# Patient Record
Sex: FEMALE | Race: WHITE | NOT HISPANIC OR LATINO | Employment: UNEMPLOYED | URBAN - METROPOLITAN AREA
[De-identification: names, ages, dates, MRNs, and addresses within clinical notes are randomized per-mention and may not be internally consistent; named-entity substitution may affect disease eponyms.]

---

## 2023-02-09 PROBLEM — I10 HTN (HYPERTENSION): Status: ACTIVE | Noted: 2023-02-09

## 2023-02-09 PROBLEM — R20.0 NUMBNESS: Status: ACTIVE | Noted: 2023-02-09

## 2023-02-09 PROBLEM — R03.0 ELEVATED BLOOD PRESSURE READING: Status: ACTIVE | Noted: 2023-02-09

## 2023-02-09 PROBLEM — R53.83 FATIGUE: Status: ACTIVE | Noted: 2023-02-09

## 2023-02-09 PROBLEM — F17.200 NICOTINE DEPENDENCE: Status: ACTIVE | Noted: 2023-02-09

## 2023-02-09 PROBLEM — E66.3 OVERWEIGHT WITH BODY MASS INDEX (BMI) OF 26 TO 26.9 IN ADULT: Status: ACTIVE | Noted: 2023-02-09

## 2023-02-09 RX ORDER — IBUPROFEN 200 MG
1 TABLET ORAL DAILY
COMMUNITY
End: 2023-03-22 | Stop reason: ALTCHOICE

## 2023-02-09 RX ORDER — IBUPROFEN 200 MG
TABLET ORAL DAILY
COMMUNITY
Start: 2022-08-31 | End: 2023-03-22 | Stop reason: ALTCHOICE

## 2023-02-09 RX ORDER — AMLODIPINE BESYLATE 5 MG/1
1 TABLET ORAL DAILY
COMMUNITY
Start: 2022-06-29 | End: 2023-03-22 | Stop reason: ALTCHOICE

## 2023-02-09 RX ORDER — AMLODIPINE BESYLATE 10 MG/1
10 TABLET ORAL DAILY
COMMUNITY
End: 2024-01-17 | Stop reason: SDUPTHER

## 2023-03-20 LAB
ALANINE AMINOTRANSFERASE (SGPT) (U/L) IN SER/PLAS: 19 U/L (ref 7–45)
ALBUMIN (G/DL) IN SER/PLAS: 4.5 G/DL (ref 3.4–5)
ALKALINE PHOSPHATASE (U/L) IN SER/PLAS: 47 U/L (ref 33–136)
ANION GAP IN SER/PLAS: 11 MMOL/L (ref 10–20)
ASPARTATE AMINOTRANSFERASE (SGOT) (U/L) IN SER/PLAS: 21 U/L (ref 9–39)
BILIRUBIN TOTAL (MG/DL) IN SER/PLAS: 0.7 MG/DL (ref 0–1.2)
CALCIUM (MG/DL) IN SER/PLAS: 9.6 MG/DL (ref 8.6–10.3)
CARBON DIOXIDE, TOTAL (MMOL/L) IN SER/PLAS: 27 MMOL/L (ref 21–32)
CHLORIDE (MMOL/L) IN SER/PLAS: 105 MMOL/L (ref 98–107)
CHOLESTEROL (MG/DL) IN SER/PLAS: 228 MG/DL (ref 0–199)
CHOLESTEROL IN HDL (MG/DL) IN SER/PLAS: 90.8 MG/DL
CHOLESTEROL/HDL RATIO: 2.5
CREATININE (MG/DL) IN SER/PLAS: 0.71 MG/DL (ref 0.5–1.05)
ERYTHROCYTE DISTRIBUTION WIDTH (RATIO) BY AUTOMATED COUNT: 13.2 % (ref 11.5–14.5)
ERYTHROCYTE MEAN CORPUSCULAR HEMOGLOBIN CONCENTRATION (G/DL) BY AUTOMATED: 31.7 G/DL (ref 32–36)
ERYTHROCYTE MEAN CORPUSCULAR VOLUME (FL) BY AUTOMATED COUNT: 94 FL (ref 80–100)
ERYTHROCYTES (10*6/UL) IN BLOOD BY AUTOMATED COUNT: 4.51 X10E12/L (ref 4–5.2)
GFR FEMALE: >90 ML/MIN/1.73M2
GLUCOSE (MG/DL) IN SER/PLAS: 101 MG/DL (ref 74–99)
HEMATOCRIT (%) IN BLOOD BY AUTOMATED COUNT: 42.6 % (ref 36–46)
HEMOGLOBIN (G/DL) IN BLOOD: 13.5 G/DL (ref 12–16)
LDL: 123 MG/DL (ref 0–99)
LEUKOCYTES (10*3/UL) IN BLOOD BY AUTOMATED COUNT: 4.9 X10E9/L (ref 4.4–11.3)
PLATELETS (10*3/UL) IN BLOOD AUTOMATED COUNT: 404 X10E9/L (ref 150–450)
POTASSIUM (MMOL/L) IN SER/PLAS: 4.3 MMOL/L (ref 3.5–5.3)
PROTEIN TOTAL: 7.2 G/DL (ref 6.4–8.2)
SODIUM (MMOL/L) IN SER/PLAS: 139 MMOL/L (ref 136–145)
THYROTROPIN (MIU/L) IN SER/PLAS BY DETECTION LIMIT <= 0.05 MIU/L: 1.87 MIU/L (ref 0.44–3.98)
TRIGLYCERIDE (MG/DL) IN SER/PLAS: 70 MG/DL (ref 0–149)
UREA NITROGEN (MG/DL) IN SER/PLAS: 17 MG/DL (ref 6–23)
VLDL: 14 MG/DL (ref 0–40)

## 2023-03-22 ENCOUNTER — OFFICE VISIT (OUTPATIENT)
Dept: PRIMARY CARE | Facility: CLINIC | Age: 68
End: 2023-03-22
Payer: MEDICARE

## 2023-03-22 VITALS
HEART RATE: 76 BPM | BODY MASS INDEX: 27.39 KG/M2 | SYSTOLIC BLOOD PRESSURE: 138 MMHG | TEMPERATURE: 97.9 F | HEIGHT: 65 IN | OXYGEN SATURATION: 98 % | DIASTOLIC BLOOD PRESSURE: 78 MMHG | WEIGHT: 164.4 LBS

## 2023-03-22 DIAGNOSIS — I10 HYPERTENSION, UNSPECIFIED TYPE: Chronic | ICD-10-CM

## 2023-03-22 DIAGNOSIS — Z00.00 ANNUAL PHYSICAL EXAM: ICD-10-CM

## 2023-03-22 DIAGNOSIS — F17.211 CIGARETTE NICOTINE DEPENDENCE IN REMISSION: ICD-10-CM

## 2023-03-22 DIAGNOSIS — Z00.00 MEDICARE ANNUAL WELLNESS VISIT, SUBSEQUENT: Primary | ICD-10-CM

## 2023-03-22 PROCEDURE — 93000 ELECTROCARDIOGRAM COMPLETE: CPT | Performed by: INTERNAL MEDICINE

## 2023-03-22 PROCEDURE — G0439 PPPS, SUBSEQ VISIT: HCPCS | Performed by: INTERNAL MEDICINE

## 2023-03-22 PROCEDURE — 1159F MED LIST DOCD IN RCRD: CPT | Performed by: INTERNAL MEDICINE

## 2023-03-22 PROCEDURE — 3075F SYST BP GE 130 - 139MM HG: CPT | Performed by: INTERNAL MEDICINE

## 2023-03-22 PROCEDURE — 99397 PER PM REEVAL EST PAT 65+ YR: CPT | Performed by: INTERNAL MEDICINE

## 2023-03-22 PROCEDURE — 1160F RVW MEDS BY RX/DR IN RCRD: CPT | Performed by: INTERNAL MEDICINE

## 2023-03-22 PROCEDURE — 99213 OFFICE O/P EST LOW 20 MIN: CPT | Performed by: INTERNAL MEDICINE

## 2023-03-22 PROCEDURE — 3078F DIAST BP <80 MM HG: CPT | Performed by: INTERNAL MEDICINE

## 2023-03-22 PROCEDURE — G0442 ANNUAL ALCOHOL SCREEN 15 MIN: HCPCS | Performed by: INTERNAL MEDICINE

## 2023-03-22 PROCEDURE — 1170F FXNL STATUS ASSESSED: CPT | Performed by: INTERNAL MEDICINE

## 2023-03-22 PROCEDURE — G0444 DEPRESSION SCREEN ANNUAL: HCPCS | Performed by: INTERNAL MEDICINE

## 2023-03-22 ASSESSMENT — ACTIVITIES OF DAILY LIVING (ADL)
TAKING_MEDICATION: INDEPENDENT
DRESSING: INDEPENDENT
GROCERY_SHOPPING: INDEPENDENT
BATHING: INDEPENDENT
MANAGING_FINANCES: INDEPENDENT
DOING_HOUSEWORK: INDEPENDENT

## 2023-03-22 ASSESSMENT — PATIENT HEALTH QUESTIONNAIRE - PHQ9
SUM OF ALL RESPONSES TO PHQ9 QUESTIONS 1 AND 2: 0
1. LITTLE INTEREST OR PLEASURE IN DOING THINGS: NOT AT ALL
2. FEELING DOWN, DEPRESSED OR HOPELESS: NOT AT ALL

## 2023-03-22 ASSESSMENT — ENCOUNTER SYMPTOMS
EYES NEGATIVE: 1
HEMATOLOGIC/LYMPHATIC NEGATIVE: 1
RESPIRATORY NEGATIVE: 1
PSYCHIATRIC NEGATIVE: 1
CONSTITUTIONAL NEGATIVE: 1
NEUROLOGICAL NEGATIVE: 1
GASTROINTESTINAL NEGATIVE: 1
CARDIOVASCULAR NEGATIVE: 1

## 2023-03-22 NOTE — PROGRESS NOTES
"Subjective   Patient ID: Isha Allen is a 67 y.o. female who presents for Medicare Annual Wellness Visit Subsequent, Annual Exam, and Follow-up.    here  for MCR ,CPE and to f/u on HTN,tolerating Amlodipine well. She was done with using nicotine patches 3 weeks ago and now she does smoke 1/2 cigarette very rarely (just a habit).  she refused to have any immunization such the flu,covid,shingles.  in past,she refused mammogram,because the last one around 15 years ago was abnormal and went for Bx and was benign,but I advised her again to go for a 3 D mammogram,\"she will think about it\".  no cough,SOB,CP.  Last cologuard 4/2/22.               Review of Systems   Constitutional: Negative.    HENT: Negative.     Eyes: Negative.    Respiratory: Negative.     Cardiovascular: Negative.    Gastrointestinal: Negative.    Genitourinary: Negative.    Neurological: Negative.    Hematological: Negative.    Psychiatric/Behavioral: Negative.         Objective   /73 (BP Location: Left arm, Patient Position: Sitting, BP Cuff Size: Large adult)   Pulse 76   Temp 36.6 °C (97.9 °F) (Temporal)   Ht 1.645 m (5' 4.75\")   Wt 74.6 kg (164 lb 6.4 oz)   SpO2 98%   BMI 27.57 kg/m²     Physical Exam  Constitutional:       Appearance: Normal appearance.   HENT:      Head: Normocephalic and atraumatic.   Eyes:      Extraocular Movements: Extraocular movements intact.      Pupils: Pupils are equal, round, and reactive to light.   Cardiovascular:      Rate and Rhythm: Normal rate and regular rhythm.      Heart sounds: Normal heart sounds.   Pulmonary:      Effort: Pulmonary effort is normal.      Breath sounds: Normal breath sounds. No wheezing or rhonchi.   Chest:   Breasts:     Right: Normal.      Left: Normal.   Abdominal:      General: Abdomen is flat. Bowel sounds are normal. There is no distension.      Palpations: Abdomen is soft.   Musculoskeletal:         General: Normal range of motion.      Cervical back: Normal range of " motion and neck supple.      Left lower leg: No edema.   Skin:     General: Skin is warm.   Neurological:      General: No focal deficit present.      Mental Status: She is alert and oriented to person, place, and time.   Psychiatric:         Mood and Affect: Mood normal.         Behavior: Behavior normal.         Assessment/Plan

## 2023-03-22 NOTE — PROGRESS NOTES
Patient is here for an annual [facility] wellness visit, CPE, and follow up.

## 2023-03-23 NOTE — ASSESSMENT & PLAN NOTE
Done well with Nicotine patches for 4-5 months,now smokes 1/2 cigarettes very infrequently,counseling done to stop completely,I recommended for her to have CT chest low dose to screen for lung ca,but she refused.

## 2023-10-11 ENCOUNTER — TELEPHONE (OUTPATIENT)
Dept: PRIMARY CARE | Facility: CLINIC | Age: 68
End: 2023-10-11
Payer: MEDICARE

## 2023-10-11 NOTE — TELEPHONE ENCOUNTER
Pt has a itchy rash on her back and torso and up her neck. Pt didn't know if Dr. Longo wanted to see her or send her to Dermatology. Please advise

## 2023-10-13 ENCOUNTER — OFFICE VISIT (OUTPATIENT)
Dept: PRIMARY CARE | Facility: CLINIC | Age: 68
End: 2023-10-13
Payer: MEDICARE

## 2023-10-13 VITALS
HEART RATE: 64 BPM | BODY MASS INDEX: 26.36 KG/M2 | HEIGHT: 65 IN | SYSTOLIC BLOOD PRESSURE: 146 MMHG | OXYGEN SATURATION: 97 % | TEMPERATURE: 97.3 F | WEIGHT: 158.2 LBS | DIASTOLIC BLOOD PRESSURE: 83 MMHG

## 2023-10-13 DIAGNOSIS — F17.211 CIGARETTE NICOTINE DEPENDENCE IN REMISSION: ICD-10-CM

## 2023-10-13 DIAGNOSIS — L28.2 PRURITIC RASH: Primary | ICD-10-CM

## 2023-10-13 DIAGNOSIS — E78.5 HYPERLIPIDEMIA, UNSPECIFIED HYPERLIPIDEMIA TYPE: ICD-10-CM

## 2023-10-13 DIAGNOSIS — I10 PRIMARY HYPERTENSION: ICD-10-CM

## 2023-10-13 PROCEDURE — 1159F MED LIST DOCD IN RCRD: CPT | Performed by: INTERNAL MEDICINE

## 2023-10-13 PROCEDURE — 1160F RVW MEDS BY RX/DR IN RCRD: CPT | Performed by: INTERNAL MEDICINE

## 2023-10-13 PROCEDURE — 3077F SYST BP >= 140 MM HG: CPT | Performed by: INTERNAL MEDICINE

## 2023-10-13 PROCEDURE — 3079F DIAST BP 80-89 MM HG: CPT | Performed by: INTERNAL MEDICINE

## 2023-10-13 PROCEDURE — 99214 OFFICE O/P EST MOD 30 MIN: CPT | Performed by: INTERNAL MEDICINE

## 2023-10-13 RX ORDER — HYDROXYZINE HYDROCHLORIDE 25 MG/1
25 TABLET, FILM COATED ORAL 3 TIMES DAILY
Qty: 90 TABLET | Refills: 0 | Status: SHIPPED | OUTPATIENT
Start: 2023-10-13 | End: 2023-10-16 | Stop reason: SDUPTHER

## 2023-10-13 RX ORDER — HYDROXYZINE PAMOATE 50 MG/1
50 CAPSULE ORAL 3 TIMES DAILY PRN
Qty: 30 CAPSULE | Refills: 0 | Status: SHIPPED | OUTPATIENT
Start: 2023-10-13 | End: 2024-04-19

## 2023-10-13 ASSESSMENT — ENCOUNTER SYMPTOMS
CARDIOVASCULAR NEGATIVE: 1
CONSTITUTIONAL NEGATIVE: 1
HEMATOLOGIC/LYMPHATIC NEGATIVE: 1
PSYCHIATRIC NEGATIVE: 1
ROS SKIN COMMENTS: ITCHING.
NEUROLOGICAL NEGATIVE: 1
RESPIRATORY NEGATIVE: 1
GASTROINTESTINAL NEGATIVE: 1
EYES NEGATIVE: 1

## 2023-10-13 ASSESSMENT — PATIENT HEALTH QUESTIONNAIRE - PHQ9
1. LITTLE INTEREST OR PLEASURE IN DOING THINGS: NOT AT ALL
2. FEELING DOWN, DEPRESSED OR HOPELESS: NOT AT ALL
SUM OF ALL RESPONSES TO PHQ9 QUESTIONS 1 AND 2: 0

## 2023-10-13 NOTE — ASSESSMENT & PLAN NOTE
We need to rule out infestation in view of the new visitor even though they do not have any similar complaint.  Will refer to a dermatologist for definitive diagnosis and management.  I advised patient to take a cooler shower.  We will treat with Medrol Dosepak and Atarax as needed.  Will order complete labs.

## 2023-10-16 ENCOUNTER — TELEPHONE (OUTPATIENT)
Dept: PRIMARY CARE | Facility: CLINIC | Age: 68
End: 2023-10-16
Payer: MEDICARE

## 2023-10-16 RX ORDER — METHYLPREDNISOLONE 4 MG/1
TABLET ORAL
Qty: 21 TABLET | Refills: 0 | Status: SHIPPED | OUTPATIENT
Start: 2023-10-16 | End: 2023-10-23

## 2023-10-16 NOTE — TELEPHONE ENCOUNTER
Pt stopped in stated medication that was called in on Friday needs clarification.   Hydroxyzine 25mg and 50mg was called in. But steroid was not.  Does she stated she took the 50mg and left 25mg at pharmacy.    Please call pt at [phone]

## 2024-01-17 ENCOUNTER — APPOINTMENT (OUTPATIENT)
Dept: DERMATOLOGY | Facility: CLINIC | Age: 69
End: 2024-01-17
Payer: MEDICARE

## 2024-01-17 DIAGNOSIS — I10 PRIMARY HYPERTENSION: Primary | ICD-10-CM

## 2024-01-17 RX ORDER — AMLODIPINE BESYLATE 10 MG/1
10 TABLET ORAL DAILY
Qty: 90 TABLET | Refills: 0 | Status: SHIPPED | OUTPATIENT
Start: 2024-01-17 | End: 2024-03-20 | Stop reason: ALTCHOICE

## 2024-03-20 ENCOUNTER — OFFICE VISIT (OUTPATIENT)
Dept: PRIMARY CARE | Facility: CLINIC | Age: 69
End: 2024-03-20
Payer: MEDICARE

## 2024-03-20 VITALS
SYSTOLIC BLOOD PRESSURE: 149 MMHG | OXYGEN SATURATION: 98 % | BODY MASS INDEX: 25.99 KG/M2 | HEART RATE: 73 BPM | HEIGHT: 65 IN | TEMPERATURE: 97.8 F | WEIGHT: 156 LBS | DIASTOLIC BLOOD PRESSURE: 77 MMHG

## 2024-03-20 DIAGNOSIS — L28.2 PRURITIC RASH: ICD-10-CM

## 2024-03-20 DIAGNOSIS — Z00.00 ROUTINE GENERAL MEDICAL EXAMINATION AT HEALTH CARE FACILITY: ICD-10-CM

## 2024-03-20 DIAGNOSIS — Z00.00 MEDICARE ANNUAL WELLNESS VISIT, SUBSEQUENT: Primary | ICD-10-CM

## 2024-03-20 DIAGNOSIS — L50.9 URTICARIA, UNSPECIFIED: ICD-10-CM

## 2024-03-20 DIAGNOSIS — I10 PRIMARY HYPERTENSION: ICD-10-CM

## 2024-03-20 DIAGNOSIS — F17.211 CIGARETTE NICOTINE DEPENDENCE IN REMISSION: ICD-10-CM

## 2024-03-20 DIAGNOSIS — Z00.00 ANNUAL PHYSICAL EXAM: ICD-10-CM

## 2024-03-20 PROCEDURE — 99214 OFFICE O/P EST MOD 30 MIN: CPT | Performed by: INTERNAL MEDICINE

## 2024-03-20 PROCEDURE — G0442 ANNUAL ALCOHOL SCREEN 15 MIN: HCPCS | Performed by: INTERNAL MEDICINE

## 2024-03-20 PROCEDURE — G0439 PPPS, SUBSEQ VISIT: HCPCS | Performed by: INTERNAL MEDICINE

## 2024-03-20 PROCEDURE — G0444 DEPRESSION SCREEN ANNUAL: HCPCS | Performed by: INTERNAL MEDICINE

## 2024-03-20 PROCEDURE — 99397 PER PM REEVAL EST PAT 65+ YR: CPT | Performed by: INTERNAL MEDICINE

## 2024-03-20 PROCEDURE — 93000 ELECTROCARDIOGRAM COMPLETE: CPT | Performed by: INTERNAL MEDICINE

## 2024-03-20 PROCEDURE — 1123F ACP DISCUSS/DSCN MKR DOCD: CPT | Performed by: INTERNAL MEDICINE

## 2024-03-20 PROCEDURE — 1160F RVW MEDS BY RX/DR IN RCRD: CPT | Performed by: INTERNAL MEDICINE

## 2024-03-20 PROCEDURE — 1157F ADVNC CARE PLAN IN RCRD: CPT | Performed by: INTERNAL MEDICINE

## 2024-03-20 PROCEDURE — 99406 BEHAV CHNG SMOKING 3-10 MIN: CPT | Performed by: INTERNAL MEDICINE

## 2024-03-20 PROCEDURE — 3078F DIAST BP <80 MM HG: CPT | Performed by: INTERNAL MEDICINE

## 2024-03-20 PROCEDURE — G0446 INTENS BEHAVE THER CARDIO DX: HCPCS | Performed by: INTERNAL MEDICINE

## 2024-03-20 PROCEDURE — 3077F SYST BP >= 140 MM HG: CPT | Performed by: INTERNAL MEDICINE

## 2024-03-20 PROCEDURE — 1170F FXNL STATUS ASSESSED: CPT | Performed by: INTERNAL MEDICINE

## 2024-03-20 PROCEDURE — 1158F ADVNC CARE PLAN TLK DOCD: CPT | Performed by: INTERNAL MEDICINE

## 2024-03-20 PROCEDURE — 1159F MED LIST DOCD IN RCRD: CPT | Performed by: INTERNAL MEDICINE

## 2024-03-20 RX ORDER — FLUOCINONIDE 0.5 MG/G
CREAM TOPICAL
COMMUNITY
Start: 2023-12-05

## 2024-03-20 RX ORDER — TRIAMCINOLONE ACETONIDE 1 MG/G
CREAM TOPICAL
COMMUNITY
Start: 2023-11-02

## 2024-03-20 RX ORDER — LOSARTAN POTASSIUM 50 MG/1
50 TABLET ORAL DAILY
Qty: 30 TABLET | Refills: 11 | Status: SHIPPED | OUTPATIENT
Start: 2024-03-20 | End: 2024-04-19 | Stop reason: DRUGHIGH

## 2024-03-20 ASSESSMENT — ACTIVITIES OF DAILY LIVING (ADL)
DOING_HOUSEWORK: INDEPENDENT
GROCERY_SHOPPING: INDEPENDENT
TAKING_MEDICATION: INDEPENDENT
BATHING: INDEPENDENT
MANAGING_FINANCES: INDEPENDENT
DRESSING: INDEPENDENT

## 2024-03-20 ASSESSMENT — ENCOUNTER SYMPTOMS
HEMATOLOGIC/LYMPHATIC NEGATIVE: 1
RESPIRATORY NEGATIVE: 1
EYES NEGATIVE: 1
CONSTITUTIONAL NEGATIVE: 1
GASTROINTESTINAL NEGATIVE: 1
CARDIOVASCULAR NEGATIVE: 1
PSYCHIATRIC NEGATIVE: 1
ROS SKIN COMMENTS: ITCHING.
NEUROLOGICAL NEGATIVE: 1

## 2024-03-20 NOTE — ASSESSMENT & PLAN NOTE
Switch from amlodipine to losartan and monitor her itching.  Her itching could be due to amlodipine?  Monitor home BP and follow-up with me in 1 month.  Continue low-sodium diet    Your ASCVD risk is 22.6%, will order CT cardiac scoring, keep BP BMI LDL at goal to prevent stroke and heart attack.  Further treatment will be decided after reviewing her CT cardiac scoring result.

## 2024-03-20 NOTE — ASSESSMENT & PLAN NOTE
I spent more than 3 minutes counseling patient about need for smoking/tobacco  cessation and how I can support efforts when patient is ready to quit,discussed Nicotine replacement therapy,bupropion,hypnosis,support groups,and acupuncture as potential options.  patient currently has no signs or symptoms of tobacco related disease.

## 2024-03-20 NOTE — ASSESSMENT & PLAN NOTE
Refer to  allergist.  May continue hydroxyzine as needed.  Will change amlodipine to losartan to see if amlodipine was the cause for the itching and follow-up in 1 month.

## 2024-03-20 NOTE — ASSESSMENT & PLAN NOTE
Patient to bring [facility].  She declined mammogram DEXA scan.  Will order blood test.  Your risk for ASCVD is 22.6%, we need to keep her LDL and BMI at goal, will check CT cardiac scoring and lipid profile and consider statin to decrease your risk for heart attack or stroke.

## 2024-03-20 NOTE — PROGRESS NOTES
"Subjective   Patient ID: Isha Allen is a 68 y.o. female who presents for Medicare Annual Wellness Visit Subsequent, Annual Exam, and Follow-up.    here  for MCR ,CPE and to f/u on HTN,on Amlodipine .  She continue to have a itching ,she saw  dermatologist, had a biopsy and was tried on many medication without any improvement and told her it could be due to amlodipine  She cut back on smoking and now 1 pack will last for 2 weeks, she denies any cough or shortness of breath, she declined to go for CT chest to screen for lung cancer  she refused to have any immunization such the flu,covid,shingles.  in past,she refused mammogram,because the last one around 15 years ago was abnormal and went for Bx and was benign,but I advised her again to go for a 3 D mammogram,\"she will think about it\".  no cough,SOB,CP.  Last cologuard 4/2/22.negative.            Review of Systems   Constitutional: Negative.    HENT: Negative.     Eyes: Negative.    Respiratory: Negative.     Cardiovascular: Negative.    Gastrointestinal: Negative.    Genitourinary: Negative.    Skin:         Itching.   Neurological: Negative.    Hematological: Negative.    Psychiatric/Behavioral: Negative.         Objective   /77 (BP Location: Right arm, Patient Position: Sitting, BP Cuff Size: Adult)   Pulse 73   Temp 36.6 °C (97.8 °F) (Temporal)   Ht 1.645 m (5' 4.75\")   Wt 70.8 kg (156 lb)   SpO2 98%   BMI 26.16 kg/m²     Physical Exam  Constitutional:       Appearance: Normal appearance.   HENT:      Head: Normocephalic and atraumatic.      Right Ear: Tympanic membrane, ear canal and external ear normal.      Left Ear: Tympanic membrane, ear canal and external ear normal.      Nose: Nose normal.      Mouth/Throat:      Mouth: Mucous membranes are moist.      Pharynx: No oropharyngeal exudate or posterior oropharyngeal erythema.   Eyes:      General: No scleral icterus.     Extraocular Movements: Extraocular movements intact.      Pupils: Pupils " are equal, round, and reactive to light.   Neck:      Vascular: No carotid bruit.   Cardiovascular:      Rate and Rhythm: Normal rate and regular rhythm.      Heart sounds: Normal heart sounds.   Pulmonary:      Effort: Pulmonary effort is normal.      Breath sounds: Normal breath sounds. No wheezing or rhonchi.   Chest:   Breasts:     Right: Normal.      Left: Normal.   Abdominal:      General: Abdomen is flat. Bowel sounds are normal. There is no distension.      Palpations: Abdomen is soft.   Musculoskeletal:         General: Normal range of motion.      Cervical back: Normal range of motion and neck supple.      Right lower leg: No edema.      Left lower leg: No edema.   Lymphadenopathy:      Cervical: No cervical adenopathy.   Skin:     General: Skin is warm.      Comments: Itchy marks on arms and upper back.   Neurological:      General: No focal deficit present.      Mental Status: She is alert and oriented to person, place, and time.   Psychiatric:         Mood and Affect: Mood normal.         Behavior: Behavior normal.         Assessment/Plan   Problem List Items Addressed This Visit             ICD-10-CM    HTN (hypertension) I10     Switch from amlodipine to losartan and monitor her itching.  Her itching could be due to amlodipine?  Monitor home BP and follow-up with me in 1 month.  Continue low-sodium diet    Your ASCVD risk is 22.6%, will order CT cardiac scoring, keep BP BMI LDL at goal to prevent stroke and heart attack.  Further treatment will be decided after reviewing her CT cardiac scoring result.           Relevant Medications    losartan (Cozaar) 50 mg tablet    Other Relevant Orders    ECG 12 lead (Clinic Performed) (Completed)    CT cardiac scoring wo IV contrast    Nicotine dependence F17.200     I spent more than 3 minutes counseling patient about need for smoking/tobacco  cessation and how I can support efforts when patient is ready to quit,discussed Nicotine replacement  therapy,bupropion,hypnosis,support groups,and acupuncture as potential options.  patient currently has no signs or symptoms of tobacco related disease.         Urticaria, unspecified L50.9     Refer to allergist.         Relevant Orders    Referral to Allergy    Pruritic rash L28.2     Refer to  allergist.  May continue hydroxyzine as needed.  Will change amlodipine to losartan to see if amlodipine was the cause for the itching and follow-up in 1 month.         Medicare annual wellness visit, subsequent - Primary Z00.00     Patient to bring [email or web address].  She declined mammogram DEXA scan.  Will order blood test.  Your risk for ASCVD is 22.6%, we need to keep her LDL and BMI at goal, will check CT cardiac scoring and lipid profile and consider statin to decrease your risk for heart attack or stroke.           Annual physical exam Z00.00     Complete physical examination completed today.  Advised to keep a heart healthy, low-fat diet as recommended diet is the Mediterranean diet.  Advised to exercise regularly for 30 minutes daily 5 days a week and maintain 150 minutes of exercise per week.  Discussed age-appropriate cancer screening,Immunization and recommendation were given.  Advised on regular eye and dental visit.  Advised on staying well-hydrated.          Other Visit Diagnoses         Codes    Routine general medical examination at health care facility     Z00.00

## 2024-03-21 PROBLEM — Z00.00 ANNUAL PHYSICAL EXAM: Status: ACTIVE | Noted: 2024-03-21

## 2024-04-12 ENCOUNTER — TELEPHONE (OUTPATIENT)
Dept: PRIMARY CARE | Facility: CLINIC | Age: 69
End: 2024-04-12
Payer: MEDICARE

## 2024-04-12 DIAGNOSIS — K04.7 TOOTH INFECTION: Primary | ICD-10-CM

## 2024-04-12 RX ORDER — AMOXICILLIN 875 MG/1
875 TABLET, FILM COATED ORAL 2 TIMES DAILY
Qty: 20 TABLET | Refills: 0 | Status: SHIPPED | OUTPATIENT
Start: 2024-04-12 | End: 2024-04-22

## 2024-04-12 NOTE — TELEPHONE ENCOUNTER
Patient states one of her crowns fell off her tooth several days ago and now she has been experiencing jaw, gum pain and shooting pains up to her temples at times. States her dentist office is closed today and she went to  Urgent Care and they did not do anything. States OTC pain relievers are not helping with pain.     Patient wants to know if you could prescribe some pain medication to get her through the weekend until she can see her dentist on Monday.     Please advise

## 2024-04-12 NOTE — TELEPHONE ENCOUNTER
I spoke to patient she lost the crown on the right upper side few days ago has been complaining of severe pain going up to the temporal, denies any fever or chills, she has tried Aleve and Tylenol, we might be dealing with tooth infection I sent the prescription for amoxicillin,, advised to take ibuprofen 600 mg twice daily or 3 times daily as needed and to monitor her blood pressure and to take them with food she will go to ER if worse over the weekend, she is planning on contacting and seeing her dentist on Monday.

## 2024-04-19 ENCOUNTER — OFFICE VISIT (OUTPATIENT)
Dept: PRIMARY CARE | Facility: CLINIC | Age: 69
End: 2024-04-19
Payer: MEDICARE

## 2024-04-19 VITALS
WEIGHT: 157.2 LBS | SYSTOLIC BLOOD PRESSURE: 150 MMHG | BODY MASS INDEX: 26.36 KG/M2 | DIASTOLIC BLOOD PRESSURE: 82 MMHG | OXYGEN SATURATION: 98 % | HEART RATE: 71 BPM

## 2024-04-19 DIAGNOSIS — I10 PRIMARY HYPERTENSION: Primary | ICD-10-CM

## 2024-04-19 DIAGNOSIS — K04.7 TOOTH INFECTION: ICD-10-CM

## 2024-04-19 DIAGNOSIS — L50.9 URTICARIA, UNSPECIFIED: ICD-10-CM

## 2024-04-19 DIAGNOSIS — F17.211 CIGARETTE NICOTINE DEPENDENCE IN REMISSION: ICD-10-CM

## 2024-04-19 DIAGNOSIS — B00.9 HERPES: ICD-10-CM

## 2024-04-19 PROCEDURE — 1160F RVW MEDS BY RX/DR IN RCRD: CPT | Performed by: INTERNAL MEDICINE

## 2024-04-19 PROCEDURE — 99214 OFFICE O/P EST MOD 30 MIN: CPT | Performed by: INTERNAL MEDICINE

## 2024-04-19 PROCEDURE — 3077F SYST BP >= 140 MM HG: CPT | Performed by: INTERNAL MEDICINE

## 2024-04-19 PROCEDURE — 1159F MED LIST DOCD IN RCRD: CPT | Performed by: INTERNAL MEDICINE

## 2024-04-19 PROCEDURE — 1157F ADVNC CARE PLAN IN RCRD: CPT | Performed by: INTERNAL MEDICINE

## 2024-04-19 PROCEDURE — 3079F DIAST BP 80-89 MM HG: CPT | Performed by: INTERNAL MEDICINE

## 2024-04-19 PROCEDURE — 1123F ACP DISCUSS/DSCN MKR DOCD: CPT | Performed by: INTERNAL MEDICINE

## 2024-04-19 RX ORDER — ACYCLOVIR 50 MG/G
1 OINTMENT TOPICAL
Qty: 5 G | Refills: 0 | Status: SHIPPED | OUTPATIENT
Start: 2024-04-19 | End: 2024-04-23

## 2024-04-19 RX ORDER — LOSARTAN POTASSIUM 100 MG/1
100 TABLET ORAL DAILY
Qty: 30 TABLET | Refills: 11 | Status: SHIPPED | OUTPATIENT
Start: 2024-04-19 | End: 2025-04-19

## 2024-04-19 RX ORDER — VALACYCLOVIR HYDROCHLORIDE 1 G/1
TABLET, FILM COATED ORAL
Qty: 30 TABLET | Refills: 0 | Status: SHIPPED | OUTPATIENT
Start: 2024-04-19

## 2024-04-19 ASSESSMENT — PATIENT HEALTH QUESTIONNAIRE - PHQ9
2. FEELING DOWN, DEPRESSED OR HOPELESS: NOT AT ALL
1. LITTLE INTEREST OR PLEASURE IN DOING THINGS: NOT AT ALL
SUM OF ALL RESPONSES TO PHQ9 QUESTIONS 1 AND 2: 0

## 2024-04-19 ASSESSMENT — ENCOUNTER SYMPTOMS
CONSTITUTIONAL NEGATIVE: 1
RESPIRATORY NEGATIVE: 1
EYES NEGATIVE: 1
NEUROLOGICAL NEGATIVE: 1
HEMATOLOGIC/LYMPHATIC NEGATIVE: 1
GASTROINTESTINAL NEGATIVE: 1
CARDIOVASCULAR NEGATIVE: 1
ROS SKIN COMMENTS: AS HPI.
MUSCULOSKELETAL NEGATIVE: 1
PSYCHIATRIC NEGATIVE: 1

## 2024-04-19 NOTE — ASSESSMENT & PLAN NOTE
Improving could be due to amlodipine and skin infection which both improved with switching her amlodipine and giving her antibiotic.

## 2024-04-19 NOTE — ASSESSMENT & PLAN NOTE
Increase dose of losartan , follow-up in 5 weeks.  Her itching could have been  due to amlodipine?   Continue low-sodium diet

## 2024-04-19 NOTE — PROGRESS NOTES
"Subjective   Patient ID: Isha Allen is a 68 y.o. female who presents for 1 month follow up .    Here to f/u on HTN,on Losartan.  Home BP sometimes in the 140s,  She also had a cold last week that she noted cold sore in her nose, she usually get it every time she had a cold, she was Blistex without any improvement.  She took my antibiotic amoxicillin for her tooth infection and was advised to see dentist and he referred her to an oral surgeon, her tooth pain has improved also her rash in the right upper arm has improved she has been using topical antibiotic as.  In past,she saw  dermatologist, had a biopsy and was tried on many medication without any improvement and told her it could be due to amlodipine, which was stopped by me last month.  She cut back on smoking and now 1 pack will last for 2 weeks, she last smoked 3 days ago, she denies any cough or shortness of breath, she declined to go for CT chest to screen for lung cancer  she refused to have any immunization such the flu,covid,shingles.  in past,she refused mammogram,because the last one around 15 years ago was abnormal and went for Bx and was benign,but I advised her again to go for a 3 D mammogram,\"she will think about it\".  no cough,SOB,CP.  Last cologuard 4/2/22.negative.            Review of Systems   Constitutional: Negative.    HENT:          As HPI.   Eyes: Negative.    Respiratory: Negative.     Cardiovascular: Negative.    Gastrointestinal: Negative.    Genitourinary: Negative.    Musculoskeletal: Negative.    Skin:         As HPI.   Neurological: Negative.    Hematological: Negative.    Psychiatric/Behavioral: Negative.         Objective   /82 (BP Location: Left arm, Patient Position: Sitting)   Pulse 71   Wt 71.3 kg (157 lb 3.2 oz)   SpO2 98%   BMI 26.36 kg/m²     Physical Exam  Vitals reviewed.   Constitutional:       Appearance: Normal appearance.   HENT:      Head: Normocephalic and atraumatic.      Right Ear: Tympanic membrane " and ear canal normal.      Left Ear: Tympanic membrane and ear canal normal.      Nose:      Comments: Residual cold sore below both nostril, no vesicles.  Eyes:      Extraocular Movements: Extraocular movements intact.      Pupils: Pupils are equal, round, and reactive to light.   Cardiovascular:      Rate and Rhythm: Normal rate and regular rhythm.      Pulses: Normal pulses.      Heart sounds: Normal heart sounds. No murmur heard.  Pulmonary:      Effort: Pulmonary effort is normal.      Breath sounds: Normal breath sounds.   Abdominal:      General: Abdomen is flat.   Musculoskeletal:         General: Normal range of motion.      Cervical back: Normal range of motion and neck supple.   Skin:     Comments: Resolved rash left upper arm noted some scarring.   Neurological:      General: No focal deficit present.      Mental Status: She is alert and oriented to person, place, and time.   Psychiatric:         Mood and Affect: Mood normal.         Assessment/Plan   Problem List Items Addressed This Visit             ICD-10-CM    HTN (hypertension) - Primary I10     Increase dose of losartan , follow-up in 5 weeks.  Her itching could have been  due to amlodipine?   Continue low-sodium diet               Relevant Medications    losartan (Cozaar) 100 mg tablet    Nicotine dependence F17.200     Patient last smoked 3 days ago advised to stop smoking and stays off smoking completely she declined nicotine patches for now,.         Urticaria, unspecified L50.9     Improving could be due to amlodipine and skin infection which both improved with switching her amlodipine and giving her antibiotic.         Herpes B00.9     Will treat with Valtrex and acyclovir ointment for flareup.         Relevant Medications    valACYclovir (Valtrex) 1 gram tablet    acyclovir (Zovirax) 5 % ointment    Tooth infection K04.7     Improving with amoxicillin follow-up with the dentist, and the oral surgeon.

## 2024-04-19 NOTE — ASSESSMENT & PLAN NOTE
Patient last smoked 3 days ago advised to stop smoking and stays off smoking completely she declined nicotine patches for now,.

## 2024-05-24 ENCOUNTER — APPOINTMENT (OUTPATIENT)
Dept: PRIMARY CARE | Facility: CLINIC | Age: 69
End: 2024-05-24
Payer: MEDICARE

## 2024-06-14 ENCOUNTER — APPOINTMENT (OUTPATIENT)
Dept: PRIMARY CARE | Facility: CLINIC | Age: 69
End: 2024-06-14
Payer: MEDICARE

## 2024-06-14 VITALS
HEART RATE: 65 BPM | BODY MASS INDEX: 25.96 KG/M2 | OXYGEN SATURATION: 100 % | HEIGHT: 65 IN | DIASTOLIC BLOOD PRESSURE: 71 MMHG | WEIGHT: 155.8 LBS | SYSTOLIC BLOOD PRESSURE: 139 MMHG | TEMPERATURE: 97.5 F

## 2024-06-14 DIAGNOSIS — F17.211 CIGARETTE NICOTINE DEPENDENCE IN REMISSION: ICD-10-CM

## 2024-06-14 DIAGNOSIS — L28.2 PRURITIC RASH: ICD-10-CM

## 2024-06-14 DIAGNOSIS — B00.9 HERPES: ICD-10-CM

## 2024-06-14 DIAGNOSIS — I10 PRIMARY HYPERTENSION: Primary | ICD-10-CM

## 2024-06-14 DIAGNOSIS — F17.200 CURRENT SMOKER: ICD-10-CM

## 2024-06-14 DIAGNOSIS — F17.210 CIGARETTE SMOKER: ICD-10-CM

## 2024-06-14 DIAGNOSIS — L50.9 URTICARIA, UNSPECIFIED: ICD-10-CM

## 2024-06-14 DIAGNOSIS — L29.9 ITCHING: ICD-10-CM

## 2024-06-14 DIAGNOSIS — E78.49 OTHER HYPERLIPIDEMIA: ICD-10-CM

## 2024-06-14 PROCEDURE — 1123F ACP DISCUSS/DSCN MKR DOCD: CPT | Performed by: INTERNAL MEDICINE

## 2024-06-14 PROCEDURE — 99214 OFFICE O/P EST MOD 30 MIN: CPT | Performed by: INTERNAL MEDICINE

## 2024-06-14 PROCEDURE — 1160F RVW MEDS BY RX/DR IN RCRD: CPT | Performed by: INTERNAL MEDICINE

## 2024-06-14 PROCEDURE — 1159F MED LIST DOCD IN RCRD: CPT | Performed by: INTERNAL MEDICINE

## 2024-06-14 PROCEDURE — G0296 VISIT TO DETERM LDCT ELIG: HCPCS | Performed by: INTERNAL MEDICINE

## 2024-06-14 PROCEDURE — 3078F DIAST BP <80 MM HG: CPT | Performed by: INTERNAL MEDICINE

## 2024-06-14 PROCEDURE — 1157F ADVNC CARE PLAN IN RCRD: CPT | Performed by: INTERNAL MEDICINE

## 2024-06-14 PROCEDURE — 3075F SYST BP GE 130 - 139MM HG: CPT | Performed by: INTERNAL MEDICINE

## 2024-06-14 ASSESSMENT — ENCOUNTER SYMPTOMS
HEMATOLOGIC/LYMPHATIC NEGATIVE: 1
CARDIOVASCULAR NEGATIVE: 1
NEUROLOGICAL NEGATIVE: 1
RESPIRATORY NEGATIVE: 1
CONSTITUTIONAL NEGATIVE: 1
EYES NEGATIVE: 1
PSYCHIATRIC NEGATIVE: 1
GASTROINTESTINAL NEGATIVE: 1

## 2024-06-14 NOTE — PROGRESS NOTES
"Subjective   Patient ID: Isha Allen is a 68 y.o. female who presents for Follow-up (Patient is here for a follow up visit.).    Here to f/u on HTN,on Losartan, tolerating higher dose well, home BP at goal.   Her rash persist despite stopping amlodipine  In past,she saw  dermatologist, had a biopsy and was tried on many medication without any improvement she did refer her to allergist.  She cut back on smoking and now 1 pack will last for 2 weeks, she last smoked 3 days ago, she denies any cough or shortness of breath, she has been smoking since age 15, she agree now to go for CT chest low-dose to screen for lung cancer  she refused to have any immunization such the flu,covid,shingles.  in past,she refused mammogram,because the last one around 15 years ago was abnormal and went for Bx and was benign,but I advised her again to go for a 3 D mammogram,\"she will think about it\".  no cough,SOB,CP.  Last cologuard 4/2/22.negative.            Review of Systems   Constitutional: Negative.    HENT: Negative.     Eyes: Negative.    Respiratory: Negative.     Cardiovascular: Negative.    Gastrointestinal: Negative.    Genitourinary: Negative.    Skin:         As HPI has skin itching.   Neurological: Negative.    Hematological: Negative.    Psychiatric/Behavioral: Negative.         Objective   /71   Pulse 65   Temp 36.4 °C (97.5 °F)   Ht 1.645 m (5' 4.75\")   Wt 70.7 kg (155 lb 12.8 oz)   SpO2 100%   BMI 26.13 kg/m²     Physical Exam  Vitals reviewed.   Constitutional:       Appearance: Normal appearance.   HENT:      Head: Normocephalic and atraumatic.      Right Ear: Tympanic membrane and ear canal normal.      Left Ear: Tympanic membrane and ear canal normal.   Eyes:      Extraocular Movements: Extraocular movements intact.      Pupils: Pupils are equal, round, and reactive to light.   Cardiovascular:      Rate and Rhythm: Normal rate and regular rhythm.      Pulses: Normal pulses.      Heart sounds: Normal " heart sounds. No murmur heard.  Pulmonary:      Effort: Pulmonary effort is normal.      Breath sounds: Normal breath sounds.   Abdominal:      General: Abdomen is flat.   Musculoskeletal:         General: Normal range of motion.      Cervical back: Normal range of motion and neck supple.   Skin:     Comments: Rash upper arms and upper back, itchy marks noted, few scattered excoriation.   Neurological:      General: No focal deficit present.      Mental Status: She is alert and oriented to person, place, and time.   Psychiatric:         Mood and Affect: Mood normal.         Assessment/Plan   Problem List Items Addressed This Visit             ICD-10-CM    HTN (hypertension) - Primary I10     Stable on losartan .   follow-up in 12 weeks.   Continue low-sodium diet               Relevant Orders    CBC    Comprehensive Metabolic Panel    Lipid Panel    TSH with reflex to Free T4 if abnormal    Nicotine dependence F17.200     Advised to stop smoking will order CT chest low-dose to screen for lung cancer.         Urticaria, unspecified L50.9    Relevant Orders    Comprehensive Metabolic Panel    TSH with reflex to Free T4 if abnormal    Pruritic rash L28.2     Refer to allergist         Herpes B00.9     Other Visit Diagnoses         Codes    Current smoker     F17.200    Relevant Orders    CT lung screening low dose    Itching     L29.9    Relevant Orders    Referral to Allergy    Cigarette smoker     F17.210    Relevant Orders    CT lung screening low dose    Other hyperlipidemia     E78.49    Relevant Orders    Lipid Panel

## 2024-10-10 PROBLEM — R53.83 FATIGUE: Status: RESOLVED | Noted: 2023-02-09 | Resolved: 2024-10-10

## 2024-10-10 PROBLEM — R03.0 ELEVATED BLOOD PRESSURE READING: Status: RESOLVED | Noted: 2023-02-09 | Resolved: 2024-10-10

## 2024-10-10 NOTE — PROGRESS NOTES
Subjective   Patient ID:   [id number]   Isha Allen is a 69 y.o. female who presents for Itching and Rash (NPV, all over body red dots that's itchy).    Chief Complaint   Patient presents with    Itching    Rash     NPV, all over body red dots that's itchy     This is a new patient, with H/O HTN, referred by Danya Longo MD, PCP, for rash.    Patient reports she had abrupt onset of itching and scratching on her left arm June 2023.  She notes she was gardening frequently back then.  She had pictures she had taken at that time.      Patient currently complains of itchy spots all over.  She initially suspected gluten trigger but in June 2024 labs ordered by Dr. Longo ruled this out as a cause.  She also suspected sulfites because she drinks a glass of wine every night.  Despite stopping her amlodipine, vitamins and personal products, the rash persisted.  She received prednisone but notes it was ineffective.  Patient has no pets but had a visiting hypoallergenic dog for a while    In the past, she saw Dr. Kerns 12-14-23 with [facility].  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  She was tried on many medications without any improvement and was referred to an allergist.    Review of Systems   Skin:  Positive for rash (itchy, red spots all over her body).     Objective   /70   Wt 70.8 kg (156 lb)   SpO2 100%   BMI 26.16 kg/m²      Physical Exam  Constitutional:       Appearance: Normal appearance.   HENT:      Head: Normocephalic and atraumatic.      Right Ear: External ear normal. There is no impacted cerumen.      Left Ear: External ear normal. There is no impacted cerumen.      Nose: No congestion or rhinorrhea.   Eyes:      Extraocular Movements: Extraocular movements intact.      Conjunctiva/sclera: Conjunctivae normal.      Pupils: Pupils are equal, round, and reactive to light.   Cardiovascular:      Rate and  Rhythm: Normal rate and regular rhythm.      Heart sounds: No murmur heard.     No friction rub. No gallop.   Pulmonary:      Effort: No respiratory distress.      Breath sounds: No wheezing, rhonchi or rales.   Skin:     General: Skin is warm and dry.      Findings: Erythema (numerous excoriations present) present.   Neurological:      Mental Status: She is alert.   Psychiatric:         Mood and Affect: Mood normal.         Behavior: Behavior normal.     Allergy testing was performed on Isha Allen using standard technique. There were no immediate complications.    Test Results  Controls  Positive Histamine: 5 x 5  Negative Saline: 0  Panel 1  Cat: 0  Cockroach: 0  Cotton Linters: 0  D. Farinae: 0  D. Pter.: 0  Do  Mold Mix 1: 0  Mold Mix 2: 0  Panel 2  Alternaria: 0  Aspergillus: 0  Common Weed: 0  Feather: 0  [place] Pi  Hamster: 0  KORT: 0  Rabbit: 0  Ragweed: 0  Tree Mix: 0    Skin testing is negative.    Assessment/Plan     Rash  abrupt onset of itching and scratching on her left arm 2023.  She notes she was gardening frequently back then.  She had pictures she had taken at that time.  She C/O generalized itchy spots.  She initially suspected gluten but 2024 labs ruled this out.  She also suspected sulfites as she drinks a glass of wine nightly.  Despite stopping amlodipine, vitamins and personal products, the rash persisted.  Prednisone was not helpful.  Dr. Kerns, [facility], did a skin Bx 23 with path showing dermal hypersensitivity on right anterolateral proximal upper arm.     Skin testing is negative.  Her rash is much more consistent with a dermatitis and not urticaria.  Urticaria classically causes wheals that resolved within 24 hours and leave the skin back to its normal appearance.  In her case she has a generalized erythema with a full skin thickness rash and excoriations.  Prurigo nodularis is also considered in this differential based on characteristics of  the excoriations, but the biopsy is more consistent with a dermatitis.      She will return for patch placement tomorrow.  If her patch test is negative we will need to consider Dupixent based on the skin surface that is affected.  Approximately 50% of her skin surface is affected.  She has tried clobetasol, triamcinolone, tacrolimus, fluocinonide, oral prednisone, zyrtec, hydroxyzine and invermectin    By signing my name below, I, Rhea Soto, attest that this documentation has been prepared under the direction and in the presence of Sonya Castro MD.  All medical record entries made by the [facility] were at my direction and personally dictated by me. I have reviewed the chart and agree that the record accurately reflects my personal performance of the history, physical exam, discussion and plan.

## 2024-10-14 ENCOUNTER — APPOINTMENT (OUTPATIENT)
Dept: ALLERGY | Facility: CLINIC | Age: 69
End: 2024-10-14
Payer: MEDICARE

## 2024-10-14 VITALS
OXYGEN SATURATION: 100 % | BODY MASS INDEX: 26.16 KG/M2 | DIASTOLIC BLOOD PRESSURE: 70 MMHG | WEIGHT: 156 LBS | SYSTOLIC BLOOD PRESSURE: 125 MMHG

## 2024-10-14 DIAGNOSIS — R21 RASH: Primary | ICD-10-CM

## 2024-10-14 PROCEDURE — 95004 PERQ TESTS W/ALRGNC XTRCS: CPT | Performed by: ALLERGY & IMMUNOLOGY

## 2024-10-14 PROCEDURE — 99204 OFFICE O/P NEW MOD 45 MIN: CPT | Performed by: ALLERGY & IMMUNOLOGY

## 2024-10-14 NOTE — PATIENT INSTRUCTIONS
Skin testing is negative.    Start Allegra one time a day for itching, runny nose and sneezing.    Return tomorrow, Tuesday 10-15-24) for TRUE patch placement and patch placements to up to 10 personal products.

## 2024-10-14 NOTE — ASSESSMENT & PLAN NOTE
abrupt onset of itching and scratching on her left arm June 2023.  She notes she was gardening frequently back then.  She had pictures she had taken at that time.  She C/O generalized itchy spots.  She initially suspected gluten but June 2024 labs ruled this out.  She also suspected sulfites as she drinks a glass of wine nightly.  Despite stopping amlodipine, vitamins and personal products, the rash persisted.  Prednisone was not helpful.  Dr. Kerns, [facility], did a skin Bx 12-14-23 with path showing dermal hypersensitivity on right anterolateral proximal upper arm.     Skin testing is negative.  Her rash is much more consistent with a dermatitis and not urticaria.  Urticaria classically causes wheals that resolved within 24 hours and leave the skin back to its normal appearance.  In her case she has a generalized erythema with a full skin thickness rash and excoriations.  Prurigo nodularis is also considered in this differential based on characteristics of the excoriations, but the biopsy is more consistent with a dermatitis.      She will return for patch placement tomorrow.  If her patch test is negative we will need to consider Dupixent based on the skin surface that is affected.  Approximately 50% of her skin surface is affected.  She has tried clobetasol, triamcinolone, tacrolimus, fluocinonide, oral prednisone, zyrtec, hydroxyzine and invermectin

## 2024-10-15 ENCOUNTER — OFFICE VISIT (OUTPATIENT)
Dept: ALLERGY | Facility: CLINIC | Age: 69
End: 2024-10-15
Payer: MEDICARE

## 2024-10-15 DIAGNOSIS — R21 RASH: Primary | ICD-10-CM

## 2024-10-15 PROCEDURE — 1159F MED LIST DOCD IN RCRD: CPT | Performed by: ALLERGY & IMMUNOLOGY

## 2024-10-15 PROCEDURE — 95044 PATCH/APPLICATION TESTS: CPT | Performed by: ALLERGY & IMMUNOLOGY

## 2024-10-15 PROCEDURE — 1160F RVW MEDS BY RX/DR IN RCRD: CPT | Performed by: ALLERGY & IMMUNOLOGY

## 2024-10-15 PROCEDURE — 1123F ACP DISCUSS/DSCN MKR DOCD: CPT | Performed by: ALLERGY & IMMUNOLOGY

## 2024-10-15 PROCEDURE — 1157F ADVNC CARE PLAN IN RCRD: CPT | Performed by: ALLERGY & IMMUNOLOGY

## 2024-10-15 NOTE — PATIENT INSTRUCTIONS
TRUE patch and patches to personal products placed today.  Biotherm-Anti Drying Body Milk  Stevie  Erno Star Lotion  Sally Deodorant  Pantene Shampoo  Pantene Conditioner  Tide  Up and Up Deep Moisture Body Wash    Please keep patch site dry.  You may remove the patch in two days, and follow up on Friday 10-18-24 for patch read.

## 2024-10-15 NOTE — PROGRESS NOTES
Subjective   Patient ID:   [id number]   Isha Allen is a 69 y.o. female who presents for Rash (Here for patch testing).    Chief Complaint   Patient presents with    Rash     Here for patch testing      Patient presents for patch placement.    Patient had abrupt, random onset of itching and scratching on her left arm June 2023.  She was gardening frequently at that time.  She saw Dr. Longo June 2024 for the persistent rash and patient stopped her amlodipine, vitamins and personal products.  The rash and itching persisted and she was prescribed prednisone with no improvement.  Patient initially suspected gluten reaction; however, Dr. Longo ordered labs ruling this out.  She also suspected sulfites because she drinks a glass every night.   She saw Dr. Kerns with [facility] and had a biopsy.    Yesterday, she complained of itchy spots all over.  She presents today for True and patch placements to personal products.    Biotherm-Anti Drying Body Milk  [place]  Erno Star Lotion  Sally Deodorant  Pantene Shampoo  Pantene Conditioner  Tide  Up and Up Deep Moisture Body Wash       Assessment/Plan     Rash  Patch testing was placed today to true test and personal products.  Biotherm-Anti Drying Body Milk  [place]  Erno Star Lotion  Sally Deodorant  Pantene Shampoo  Pantene Conditioner  Tide  Up and Up Deep Moisture Body Wash    By signing my name below, I, Rhea Soto, attest that this documentation has been prepared under the direction and in the presence of Sonya Castro MD.  All medical record entries made by the Scribe were at my direction and personally dictated by me. I have reviewed the chart and agree that the record accurately reflects my personal performance of the history, physical exam, discussion and plan.          Lab draw and EKG complete

## 2024-10-16 NOTE — ASSESSMENT & PLAN NOTE
Patch testing was placed today to true test and personal products.  Biotherm-Anti Drying Body Milk  Stevie  Erno Star Lotion  Sally Deodorant  Pantene Shampoo  Pantene Conditioner  Tide  Up and Up Deep Moisture Body Wash

## 2024-10-17 NOTE — PATIENT INSTRUCTIONS
Patches are positive to Pantene Shampoo (mild).    I recommend switching to Free & Clear Shampoo by Vanicream.    Tide is likely an irritant but I recommend you switch to Tide Free & Clear.    Follow up in 5 to 6 weeks or sooner if problems or symptoms worsen prior.  If symptoms persist, we will discuss Dupixent.

## 2024-10-17 NOTE — PROGRESS NOTES
Subjective   Patient ID:   [id number]   Isha Allen is a 69 y.o. female who presents for Follow-up (Patch test read, true test and personal products).    Chief Complaint   Patient presents with    Follow-up     Patch test read, true test and personal products      Patient presents for patches read.     Patient had abrupt, random onset of itching and scratching on her left arm June 2023.  She was gardening frequently at that time.  She saw Dr. Longo June 2024 for the persistent rash and patient stopped her amlodipine, vitamins and personal products.  The rash and itching persisted and she was prescribed prednisone with no improvement.  Patient initially suspected gluten reaction; however, Dr. Longo ordered labs ruling this out.  She also suspected sulfites because she drinks a glass every night.   She saw Dr. Kerns with [facility] because she was the first one she could see quickly and had a biopsy.       On 10-15-24, she had True and patches placed to:     Biotherm-Anti Drying Body Milk  [place]  Erno Star Lotion  Sally Deodorant  Pantene Shampoo  Pantene Conditioner  Tide  Up and Up Deep Moisture Body Wash          Objective   Pulse 62   Wt 70.8 kg (156 lb)   SpO2 96%   BMI 26.16 kg/m²      Patch testing was performed on Isha Allen using standard technique. Tests were read after 72 hours.     Test Results  Panel 1 Allergy Patch Antigens    1 = Nickel Sulfate: 0    2 = Wool Alcohols (Lanolin): 0    3 = Neomycin Sulfate: 0    4 = Potassium Dichromate: 0    5 = Artis Mix: 0    6 = Fragrance Mix: 0    7 = Colophony: 0    8 = Paraben Mix: 0    9 = Negative Control: 0  10 = Balsam of [place]: 0  11 = Ethylenediamine Dihydrochloride: 0  12 = Cobalt Dichloride: 0    Biotherm-Anti Drying Body Milk: neg  Stevie: neg  Erno Star Lotion : neg  Sally Deodorant : neg  Pantene Shampoo : 1+  Pantene Conditioner : neg  Tide: Irritant  Up and Up Deep Moisture Body Wash : neg    Please see media tab for  complete results    Assessment/Plan     Atopic eczema  Pantene shampoo is mildly positive.  I recommend that she change to Vanicream Free and clear products.  In addition, the Tide detergent had a strong irritant reaction.  I recommend that she change to free and clear products.    If her rash persists she should consider starting Dupixent.  We discussed it will take 3 to 5 weeks for the rash to resolve.  Rash currently to bilateral arms, back and abdomen.  This is greater than 25% of her body surface area.  Approximately 40% of her body surface area is infected.  She has failed oral steroids, hydroxyzine, clobetasol, fluocinolone cream and Invermectin    By signing my name below, I, Rhea Soto, attest that this documentation has been prepared under the direction and in the presence of Sonya Castro MD.  All medical record entries made by the Scribe were at my direction and personally dictated by me. I have reviewed the chart and agree that the record accurately reflects my personal performance of the history, physical exam, discussion and plan.

## 2024-10-18 ENCOUNTER — OFFICE VISIT (OUTPATIENT)
Dept: ALLERGY | Facility: CLINIC | Age: 69
End: 2024-10-18
Payer: MEDICARE

## 2024-10-18 VITALS — HEART RATE: 62 BPM | WEIGHT: 156 LBS | BODY MASS INDEX: 26.16 KG/M2 | OXYGEN SATURATION: 96 %

## 2024-10-18 DIAGNOSIS — L20.89 OTHER ATOPIC DERMATITIS: Primary | ICD-10-CM

## 2024-10-18 PROCEDURE — 1157F ADVNC CARE PLAN IN RCRD: CPT | Performed by: ALLERGY & IMMUNOLOGY

## 2024-10-18 PROCEDURE — 1160F RVW MEDS BY RX/DR IN RCRD: CPT | Performed by: ALLERGY & IMMUNOLOGY

## 2024-10-18 PROCEDURE — 99213 OFFICE O/P EST LOW 20 MIN: CPT | Performed by: ALLERGY & IMMUNOLOGY

## 2024-10-18 PROCEDURE — 1159F MED LIST DOCD IN RCRD: CPT | Performed by: ALLERGY & IMMUNOLOGY

## 2024-10-18 PROCEDURE — 1123F ACP DISCUSS/DSCN MKR DOCD: CPT | Performed by: ALLERGY & IMMUNOLOGY

## 2024-10-21 PROBLEM — L20.89 OTHER ATOPIC DERMATITIS: Status: RESOLVED | Noted: 2023-10-13 | Resolved: 2024-10-21

## 2024-10-21 PROBLEM — L20.89 OTHER ATOPIC DERMATITIS: Status: ACTIVE | Noted: 2023-10-13

## 2024-10-21 PROBLEM — L20.9 ATOPIC ECZEMA: Status: RESOLVED | Noted: 2023-10-13 | Resolved: 2024-10-21

## 2024-10-21 NOTE — ASSESSMENT & PLAN NOTE
Pantene shampoo is mildly positive.  I recommend that she change to Vanicream Free and clear products.  In addition, the Tide detergent had a strong irritant reaction.  I recommend that she change to free and clear products.    If her rash persists she should consider starting Dupixent.  We discussed it will take 3 to 5 weeks for the rash to resolve.  Rash currently to bilateral arms, back and abdomen.  This is greater than 25% of her body surface area.  Approximately 40% of her body surface area is infected.  She has failed oral steroids, hydroxyzine, clobetasol, fluocinolone cream and Invermectin

## 2024-10-23 ENCOUNTER — APPOINTMENT (OUTPATIENT)
Dept: PRIMARY CARE | Facility: CLINIC | Age: 69
End: 2024-10-23
Payer: MEDICARE

## 2024-10-23 VITALS
HEART RATE: 70 BPM | BODY MASS INDEX: 25.79 KG/M2 | WEIGHT: 153.8 LBS | OXYGEN SATURATION: 98 % | TEMPERATURE: 97.5 F | SYSTOLIC BLOOD PRESSURE: 128 MMHG | DIASTOLIC BLOOD PRESSURE: 78 MMHG

## 2024-10-23 DIAGNOSIS — I10 PRIMARY HYPERTENSION: Primary | ICD-10-CM

## 2024-10-23 DIAGNOSIS — M31.6 OTHER GIANT CELL ARTERITIS: ICD-10-CM

## 2024-10-23 DIAGNOSIS — F17.211 CIGARETTE NICOTINE DEPENDENCE IN REMISSION: ICD-10-CM

## 2024-10-23 DIAGNOSIS — L29.9 PRURITUS: ICD-10-CM

## 2024-10-23 PROCEDURE — G2211 COMPLEX E/M VISIT ADD ON: HCPCS | Performed by: INTERNAL MEDICINE

## 2024-10-23 PROCEDURE — 1123F ACP DISCUSS/DSCN MKR DOCD: CPT | Performed by: INTERNAL MEDICINE

## 2024-10-23 PROCEDURE — 1159F MED LIST DOCD IN RCRD: CPT | Performed by: INTERNAL MEDICINE

## 2024-10-23 PROCEDURE — 1160F RVW MEDS BY RX/DR IN RCRD: CPT | Performed by: INTERNAL MEDICINE

## 2024-10-23 PROCEDURE — 1157F ADVNC CARE PLAN IN RCRD: CPT | Performed by: INTERNAL MEDICINE

## 2024-10-23 PROCEDURE — 3078F DIAST BP <80 MM HG: CPT | Performed by: INTERNAL MEDICINE

## 2024-10-23 PROCEDURE — 99214 OFFICE O/P EST MOD 30 MIN: CPT | Performed by: INTERNAL MEDICINE

## 2024-10-23 PROCEDURE — 3074F SYST BP LT 130 MM HG: CPT | Performed by: INTERNAL MEDICINE

## 2024-10-23 ASSESSMENT — ENCOUNTER SYMPTOMS
MUSCULOSKELETAL NEGATIVE: 1
PSYCHIATRIC NEGATIVE: 1
ROS SKIN COMMENTS: ITCHY RASH
NEUROLOGICAL NEGATIVE: 1
CONSTITUTIONAL NEGATIVE: 1
EYES NEGATIVE: 1
GASTROINTESTINAL NEGATIVE: 1
HEMATOLOGIC/LYMPHATIC NEGATIVE: 1
RESPIRATORY NEGATIVE: 1
CARDIOVASCULAR NEGATIVE: 1

## 2024-10-23 NOTE — PROGRESS NOTES
"Subjective   Patient ID: Isha Allen is a 69 y.o. female who presents for Follow-up (Patient is here for a 4 month follow up. ).    Here to f/u on HTN,on Losartan, tolerating higher dose well, home BP at goal.   Her rash persist despite stopping amlodipine, she recently saw the allergist Dr. Castro, had testing note reviewed with patient, she was advised to keep her follow-up appointment with her  In past,she saw  dermatologist, had a biopsy and was tried on many medication without any improvement she did refer her to allergist.  She cut back on smoking and now 1 pack will last for 2 weeks, she last smoked 3 days ago, she denies any cough or shortness of breath, she has been smoking since age 15, she refused to go for CT chest low-dose to screen for lung cancer  she refused to have any immunization such the flu,covid,shingles.  in past,she refused mammogram,because the last one around 15 years ago was abnormal and went for Bx and was benign,but I advised her again to go for a 3 D mammogram,\"she will think about it\".  no cough,SOB,CP.  Last cologuard 4/2/22.negative.  She denies any other complaint cough headache arthralgia myalgia.  She did not go for her labs yet            Review of Systems   Constitutional: Negative.    HENT: Negative.     Eyes: Negative.    Respiratory: Negative.     Cardiovascular: Negative.    Gastrointestinal: Negative.    Genitourinary: Negative.    Musculoskeletal: Negative.    Skin:         Itchy rash   Neurological: Negative.    Hematological: Negative.    Psychiatric/Behavioral: Negative.         Objective   /78 (BP Location: Left arm, Patient Position: Sitting)   Pulse 70   Temp 36.4 °C (97.5 °F) (Temporal)   Wt 69.8 kg (153 lb 12.8 oz)   SpO2 98%   BMI 25.79 kg/m²     Physical Exam  Vitals reviewed.   Constitutional:       Appearance: Normal appearance.   HENT:      Head: Normocephalic and atraumatic.      Right Ear: Tympanic membrane and ear canal normal.      Left " Ear: Tympanic membrane and ear canal normal.   Eyes:      Extraocular Movements: Extraocular movements intact.      Pupils: Pupils are equal, round, and reactive to light.   Cardiovascular:      Rate and Rhythm: Normal rate and regular rhythm.      Pulses: Normal pulses.      Heart sounds: Normal heart sounds. No murmur heard.  Pulmonary:      Effort: Pulmonary effort is normal.      Breath sounds: Normal breath sounds.   Abdominal:      General: Abdomen is flat.   Musculoskeletal:         General: Normal range of motion.      Cervical back: Normal range of motion and neck supple.      Right lower leg: No edema.      Left lower leg: No edema.   Skin:     Comments: Rash upper arms and upper back, itchy marks noted, few scattered excoriation.  Also noted few spot where she reacted to the skin testing on her back.   Neurological:      General: No focal deficit present.      Mental Status: She is alert and oriented to person, place, and time.   Psychiatric:         Mood and Affect: Mood normal.         Assessment/Plan   Problem List Items Addressed This Visit             ICD-10-CM    HTN (hypertension) - Primary I10     Stable on losartan .   follow-up in 12 weeks.   Continue low-sodium diet  Advised patient that she is due for her lab work I did remind her to go for her blood test.               Nicotine dependence F17.200     Advised to stop smoking , she refused to go for CT chest low-dose to screen for lung cancer.         RESOLVED: Other giant cell arteritis M31.6     Asymptomatic.         Pruritus L29.9     Continue Allegra as per Dr. Castro advised.  Go for blood test.  Follow-up with allergist.

## 2024-10-23 NOTE — ASSESSMENT & PLAN NOTE
Stable on losartan .   follow-up in 12 weeks.   Continue low-sodium diet  Advised patient that she is due for her lab work I did remind her to go for her blood test.

## 2024-12-01 PROBLEM — R20.0 NUMBNESS: Status: RESOLVED | Noted: 2023-02-09 | Resolved: 2024-12-01

## 2024-12-01 PROBLEM — L20.9 ATOPIC DERMATITIS: Status: ACTIVE | Noted: 2023-10-13

## 2024-12-02 ENCOUNTER — APPOINTMENT (OUTPATIENT)
Dept: ALLERGY | Facility: CLINIC | Age: 69
End: 2024-12-02
Payer: MEDICARE

## 2024-12-02 VITALS — HEIGHT: 65 IN | BODY MASS INDEX: 25.49 KG/M2 | WEIGHT: 153 LBS

## 2024-12-02 DIAGNOSIS — L20.9 ATOPIC DERMATITIS, UNSPECIFIED TYPE: Primary | ICD-10-CM

## 2024-12-02 PROCEDURE — 1159F MED LIST DOCD IN RCRD: CPT | Performed by: ALLERGY & IMMUNOLOGY

## 2024-12-02 PROCEDURE — 1160F RVW MEDS BY RX/DR IN RCRD: CPT | Performed by: ALLERGY & IMMUNOLOGY

## 2024-12-02 PROCEDURE — 3008F BODY MASS INDEX DOCD: CPT | Performed by: ALLERGY & IMMUNOLOGY

## 2024-12-02 PROCEDURE — 1157F ADVNC CARE PLAN IN RCRD: CPT | Performed by: ALLERGY & IMMUNOLOGY

## 2024-12-02 PROCEDURE — 1123F ACP DISCUSS/DSCN MKR DOCD: CPT | Performed by: ALLERGY & IMMUNOLOGY

## 2024-12-02 PROCEDURE — 99213 OFFICE O/P EST LOW 20 MIN: CPT | Performed by: ALLERGY & IMMUNOLOGY

## 2024-12-02 NOTE — PATIENT INSTRUCTIONS
If your rash does not improve or resolve, call me and I will order Dupixent.    Continue topicals as needed.    Follow up as needed.

## 2024-12-02 NOTE — PROGRESS NOTES
"  Subjective   Patient ID:   [id number]   Isha Allen is a 69 y.o. female who presents for Follow-up.    Chief Complaint   Patient presents with    Follow-up     Patient presents for F/U of rash.    Since last visit, 10-18-24, patient reports she had to go to the dentist for a bad tooth and just getting over a URI.     Patient states her rash has improved significantly and itching is less, but not completely resolved.  Her right arm is worse than the left, though she is unsure why.  She also never has had the rash or itching on her legs.  She uses a cream she has used a long time but feels the topical steroid ointments were ineffective.  She thinks the creams helped and Neosporin helps heal the open spots.  She tried hydroxyzine, which \"knocked her out.\"  She prefers to wait for a bit prior to starting Dupixent.      Patient is asking why the shampoo is not causing her scalp to have symptoms.  She thinks her detergent may be triggering her Sx so she tried [employer].  She was recommended Native brand and she has the \"other\" shampoo now.     Review of Systems   HENT:          Tooth infection.   Skin:  Positive for rash (bilateral arms though she is improving and itching less).     Objective   Ht 1.645 m (5' 4.75\")   Wt 69.4 kg (153 lb)   BMI 25.66 kg/m²      Physical Exam  Constitutional:       Appearance: Normal appearance.   HENT:      Head: Normocephalic and atraumatic.      Right Ear: External ear normal. There is no impacted cerumen.      Left Ear: External ear normal. There is no impacted cerumen.      Nose: No congestion or rhinorrhea.   Eyes:      Extraocular Movements: Extraocular movements intact.      Conjunctiva/sclera: Conjunctivae normal.      Pupils: Pupils are equal, round, and reactive to light.   Cardiovascular:      Rate and Rhythm: Normal rate and regular rhythm.      Heart sounds: No murmur heard.     No friction rub. No gallop.   Pulmonary:      Effort: No respiratory distress.      Breath " sounds: No wheezing, rhonchi or rales.   Skin:     General: Skin is warm and dry.      Findings: Rash (excoriations on bilateral arms, right greater than left) present.   Neurological:      Mental Status: She is alert.   Psychiatric:         Mood and Affect: Mood normal.         Behavior: Behavior normal.     Assessment/Plan     Atopic dermatitis  Her rash has improved significantly and she is itching less but Sx are not resolved.  She uses an OTC cream she has used long-term but feels topical steroids/ointments did not help.      Exam shows excoriations on bilateral arms, right greater than left.    If rash does not improve or resolve, I asked her to call me so I can order Dupixent.    She will continue topicals as needed and F/U PRN otherwise.    By signing my name below, I, Rhea Soto, attest that this documentation has been prepared under the direction and in the presence of Sonya Castro MD.  All medical record entries made by the Scribe were at my direction and personally dictated by me. I have reviewed the chart and agree that the record accurately reflects my personal performance of the history, physical exam, discussion and plan. T

## 2024-12-02 NOTE — ASSESSMENT & PLAN NOTE
Her rash has improved significantly and she is itching less but Sx are not resolved.  She uses an OTC cream she has used long-term but feels topical steroids/ointments did not help.      Exam shows excoriations on bilateral arms, right greater than left.    If rash does not improve or resolve, I asked her to call me so I can order Dupixent.    She will continue topicals as needed and F/U PRN otherwise.

## 2025-01-28 ENCOUNTER — APPOINTMENT (OUTPATIENT)
Dept: PRIMARY CARE | Facility: CLINIC | Age: 70
End: 2025-01-28
Payer: MEDICARE

## 2025-03-21 ENCOUNTER — APPOINTMENT (OUTPATIENT)
Dept: PRIMARY CARE | Facility: CLINIC | Age: 70
End: 2025-03-21
Payer: MEDICARE

## 2025-05-05 DIAGNOSIS — I10 PRIMARY HYPERTENSION: ICD-10-CM

## 2025-05-05 RX ORDER — LOSARTAN POTASSIUM 100 MG/1
100 TABLET ORAL DAILY
Qty: 90 TABLET | Refills: 3 | Status: SHIPPED | OUTPATIENT
Start: 2025-05-05 | End: 2026-05-05

## 2025-06-09 ENCOUNTER — APPOINTMENT (OUTPATIENT)
Dept: PRIMARY CARE | Facility: CLINIC | Age: 70
End: 2025-06-09
Payer: MEDICARE

## 2025-06-09 VITALS
TEMPERATURE: 97.9 F | OXYGEN SATURATION: 97 % | WEIGHT: 155.8 LBS | SYSTOLIC BLOOD PRESSURE: 137 MMHG | BODY MASS INDEX: 26.6 KG/M2 | HEIGHT: 64 IN | DIASTOLIC BLOOD PRESSURE: 76 MMHG | HEART RATE: 76 BPM

## 2025-06-09 DIAGNOSIS — I10 PRIMARY HYPERTENSION: ICD-10-CM

## 2025-06-09 DIAGNOSIS — L29.9 PRURITUS: ICD-10-CM

## 2025-06-09 DIAGNOSIS — Z00.00 MEDICARE ANNUAL WELLNESS VISIT, SUBSEQUENT: Primary | ICD-10-CM

## 2025-06-09 DIAGNOSIS — Z00.00 ROUTINE GENERAL MEDICAL EXAMINATION AT HEALTH CARE FACILITY: ICD-10-CM

## 2025-06-09 DIAGNOSIS — Z00.00 ANNUAL PHYSICAL EXAM: ICD-10-CM

## 2025-06-09 DIAGNOSIS — Z12.11 SCREEN FOR COLON CANCER: ICD-10-CM

## 2025-06-09 PROBLEM — F17.200 NICOTINE DEPENDENCE: Status: RESOLVED | Noted: 2023-02-09 | Resolved: 2025-06-09

## 2025-06-09 PROCEDURE — 3075F SYST BP GE 130 - 139MM HG: CPT | Performed by: INTERNAL MEDICINE

## 2025-06-09 PROCEDURE — 1170F FXNL STATUS ASSESSED: CPT | Performed by: INTERNAL MEDICINE

## 2025-06-09 PROCEDURE — 1123F ACP DISCUSS/DSCN MKR DOCD: CPT | Performed by: INTERNAL MEDICINE

## 2025-06-09 PROCEDURE — 3008F BODY MASS INDEX DOCD: CPT | Performed by: INTERNAL MEDICINE

## 2025-06-09 PROCEDURE — G0439 PPPS, SUBSEQ VISIT: HCPCS | Performed by: INTERNAL MEDICINE

## 2025-06-09 PROCEDURE — 1160F RVW MEDS BY RX/DR IN RCRD: CPT | Performed by: INTERNAL MEDICINE

## 2025-06-09 PROCEDURE — 1159F MED LIST DOCD IN RCRD: CPT | Performed by: INTERNAL MEDICINE

## 2025-06-09 PROCEDURE — 99397 PER PM REEVAL EST PAT 65+ YR: CPT | Performed by: INTERNAL MEDICINE

## 2025-06-09 PROCEDURE — 93000 ELECTROCARDIOGRAM COMPLETE: CPT | Performed by: INTERNAL MEDICINE

## 2025-06-09 PROCEDURE — 99214 OFFICE O/P EST MOD 30 MIN: CPT | Performed by: INTERNAL MEDICINE

## 2025-06-09 PROCEDURE — 3078F DIAST BP <80 MM HG: CPT | Performed by: INTERNAL MEDICINE

## 2025-06-09 PROCEDURE — 1157F ADVNC CARE PLAN IN RCRD: CPT | Performed by: INTERNAL MEDICINE

## 2025-06-09 RX ORDER — BISMUTH SUBSALICYLATE 262 MG
1 TABLET,CHEWABLE ORAL DAILY
COMMUNITY

## 2025-06-09 ASSESSMENT — PATIENT HEALTH QUESTIONNAIRE - PHQ9
1. LITTLE INTEREST OR PLEASURE IN DOING THINGS: NOT AT ALL
SUM OF ALL RESPONSES TO PHQ9 QUESTIONS 1 AND 2: 0
2. FEELING DOWN, DEPRESSED OR HOPELESS: NOT AT ALL

## 2025-06-09 ASSESSMENT — ACTIVITIES OF DAILY LIVING (ADL)
TAKING_MEDICATION: INDEPENDENT
DRESSING: INDEPENDENT
MANAGING_FINANCES: INDEPENDENT
BATHING: INDEPENDENT
GROCERY_SHOPPING: INDEPENDENT
DOING_HOUSEWORK: INDEPENDENT

## 2025-06-09 ASSESSMENT — ENCOUNTER SYMPTOMS
CONSTITUTIONAL NEGATIVE: 1
GASTROINTESTINAL NEGATIVE: 1
ROS SKIN COMMENTS: AS HPI
CARDIOVASCULAR NEGATIVE: 1
NEUROLOGICAL NEGATIVE: 1
HEMATOLOGIC/LYMPHATIC NEGATIVE: 1
EYES NEGATIVE: 1
PSYCHIATRIC NEGATIVE: 1
RESPIRATORY NEGATIVE: 1

## 2025-06-09 NOTE — PROGRESS NOTES
"Subjective   Reason for Visit: Isha Allen is an 69 y.o. female here for a Medicare Wellness visit.     Past Medical, Surgical, and Family History reviewed and updated in chart.         Here for MCR,CPE and to f/u on HTN,on Losartan, tolerating higher dose well, home BP at goal.   Her rash improved by changing to natural soap and using noncalcified water etc.,she was seen in the past by the allergist Dr. Castro,and also was seen by dermatologist, had a biopsy and was tried on many medication without any improvement .  She stopped smoking in  March 2025, she denies any cough or shortness of breath, she has been smoking since age 15, she refused to go for CT chest low-dose to screen for lung cancer  she refused to have any immunization such the flu,covid,shingles.  in past,she refused mammogram,because the last one around 16 years ago was abnormal and went for Bx and was benign,but I advised her again to go for a 3 D mammogram,\"she will think about it\".  Patient refused DEXA scan.  no cough,SOB,CP.  Last cologuard 4/2/22.negative.  She denies any other complaint such cough ,headache ,arthralgia or  myalgia.  She did not go for her labs yet           Patient Care Team:  Danya Longo MD as PCP - General (Internal Medicine)  Danya Longo MD as PCP - [employer] PCP  Sonya Castro MD as Referring Physician (Allergy and Immunology)     Review of Systems   Constitutional: Negative.    HENT: Negative.     Eyes: Negative.    Respiratory: Negative.     Cardiovascular: Negative.    Gastrointestinal: Negative.    Genitourinary: Negative.    Skin:         As HPI   Neurological: Negative.    Hematological: Negative.    Psychiatric/Behavioral: Negative.         Objective   Vitals:  /76 (BP Location: Left arm, Patient Position: Sitting, BP Cuff Size: Adult)   Pulse 76   Temp 36.6 °C (97.9 °F) (Temporal)   Ht 1.63 m (5' 4.17\")   Wt 70.7 kg (155 lb 12.8 oz)   SpO2 97%   BMI 26.60 kg/m²   " "    Physical Exam  Vitals reviewed.   Constitutional:       General: She is not in acute distress.     Appearance: Normal appearance.   HENT:      Head: Normocephalic and atraumatic.      Right Ear: Tympanic membrane, ear canal and external ear normal.      Left Ear: Tympanic membrane, ear canal and external ear normal.      Nose: Nose normal.      Mouth/Throat:      Mouth: Mucous membranes are moist.      Pharynx: No oropharyngeal exudate or posterior oropharyngeal erythema.   Eyes:      General: No scleral icterus.     Extraocular Movements: Extraocular movements intact.      Conjunctiva/sclera: Conjunctivae normal.      Pupils: Pupils are equal, round, and reactive to light.   Neck:      Vascular: No carotid bruit.   Cardiovascular:      Rate and Rhythm: Normal rate and regular rhythm.      Pulses: Normal pulses.      Heart sounds: Normal heart sounds. No murmur heard.  Pulmonary:      Effort: Pulmonary effort is normal. No respiratory distress.      Breath sounds: Normal breath sounds. No wheezing or rales.   Chest:   Breasts:     Right: Normal. No mass or tenderness.      Left: Normal. No mass or tenderness.   Abdominal:      General: Abdomen is flat. Bowel sounds are normal.      Palpations: Abdomen is soft.      Tenderness: There is no right CVA tenderness or left CVA tenderness.   Musculoskeletal:         General: Normal range of motion.      Cervical back: Normal range of motion and neck supple.      Right lower leg: No edema.      Left lower leg: No edema.      Comments: Unchanged \"pigeon chest\", prominence of her upper sternum, it does run in the family   Lymphadenopathy:      Cervical: No cervical adenopathy.      Upper Body:      Right upper body: No supraclavicular or axillary adenopathy.      Left upper body: No supraclavicular or axillary adenopathy.   Neurological:      General: No focal deficit present.      Mental Status: She is alert and oriented to person, place, and time.      Cranial Nerves: " No cranial nerve deficit.      Coordination: Coordination normal.      Deep Tendon Reflexes: Reflexes normal.   Psychiatric:         Mood and Affect: Mood normal.         Assessment & Plan  Primary hypertension  Stable on losartan .   follow-up in 6 months   Continue low-sodium diet  Advised patient that she is due for her lab work I did remind her to go for her blood test.        Orders:    ECG 12 lead (Clinic Performed)    CBC; Future    Comprehensive Metabolic Panel; Future    Lipid Panel; Future    TSH with reflex to Free T4 if abnormal; Future     Routine general medical examination at health care facility    Orders:    1 Year Follow Up In [facility] - PCP - Wellness Exam; Future    Medicare annual wellness visit, subsequent         Screen for colon cancer    Orders:    Cologuard® colon cancer screening; Future    Pruritus  Improved         Annual physical exam  Complete physical examination completed today.  Advised to keep a heart healthy, low-fat diet as recommended diet is the Mediterranean diet.  Advised to exercise regularly for 30 minutes daily 5 days a week and maintain 150 minutes of exercise per week.  Discussed age-appropriate cancer screening,Immunization and recommendation were given.  Advised on regular eye and dental visit.  Advised on staying well-hydrated.  Patient declined any immunization mammogram or DEXA scan,  She is due for Cologuard, she declined colonoscopy in the past.

## 2025-06-09 NOTE — ASSESSMENT & PLAN NOTE
Stable on losartan .   follow-up in 6 months   Continue low-sodium diet  Advised patient that she is due for her lab work I did remind her to go for her blood test.        Orders:    ECG 12 lead (Clinic Performed)    CBC; Future    Comprehensive Metabolic Panel; Future    Lipid Panel; Future    TSH with reflex to Free T4 if abnormal; Future

## 2025-06-09 NOTE — ASSESSMENT & PLAN NOTE
Complete physical examination completed today.  Advised to keep a heart healthy, low-fat diet as recommended diet is the Mediterranean diet.  Advised to exercise regularly for 30 minutes daily 5 days a week and maintain 150 minutes of exercise per week.  Discussed age-appropriate cancer screening,Immunization and recommendation were given.  Advised on regular eye and dental visit.  Advised on staying well-hydrated.  Patient declined any immunization mammogram or DEXA scan,  She is due for Cologuard, she declined colonoscopy in the past.

## 2025-08-19 ENCOUNTER — PATIENT OUTREACH (OUTPATIENT)
Dept: CARE COORDINATION | Facility: CLINIC | Age: 70
End: 2025-08-19
Payer: MEDICARE

## 2025-08-19 DIAGNOSIS — Z12.31 ENCOUNTER FOR SCREENING MAMMOGRAM FOR BREAST CANCER: ICD-10-CM

## 2025-12-09 ENCOUNTER — APPOINTMENT (OUTPATIENT)
Dept: PRIMARY CARE | Facility: CLINIC | Age: 70
End: 2025-12-09
Payer: MEDICARE